# Patient Record
Sex: FEMALE | Race: BLACK OR AFRICAN AMERICAN | NOT HISPANIC OR LATINO | URBAN - METROPOLITAN AREA
[De-identification: names, ages, dates, MRNs, and addresses within clinical notes are randomized per-mention and may not be internally consistent; named-entity substitution may affect disease eponyms.]

---

## 2018-09-21 ENCOUNTER — HOSPITAL ENCOUNTER (OUTPATIENT)
Dept: MEDSURG UNIT | Facility: HOSPITAL | Age: 43
End: 2018-09-22

## 2019-02-04 ENCOUNTER — HISTORICAL (OUTPATIENT)
Dept: INFUSION THERAPY | Facility: HOSPITAL | Age: 44
End: 2019-02-04

## 2019-12-15 ENCOUNTER — HISTORICAL (OUTPATIENT)
Dept: INFUSION THERAPY | Facility: HOSPITAL | Age: 44
End: 2019-12-15

## 2019-12-19 ENCOUNTER — HISTORICAL (OUTPATIENT)
Dept: INFUSION THERAPY | Facility: HOSPITAL | Age: 44
End: 2019-12-19

## 2022-03-24 ENCOUNTER — HISTORICAL (OUTPATIENT)
Dept: RADIOLOGY | Facility: HOSPITAL | Age: 47
End: 2022-03-24

## 2022-03-24 ENCOUNTER — HISTORICAL (OUTPATIENT)
Dept: ADMINISTRATIVE | Facility: HOSPITAL | Age: 47
End: 2022-03-24

## 2022-03-24 LAB
ALBUMIN SERPL-MCNC: 3.6 G/DL (ref 3.5–5)
ALBUMIN/GLOB SERPL: 1 {RATIO} (ref 1.1–2)
ALP SERPL-CCNC: 113 U/L (ref 40–150)
ALT SERPL-CCNC: 21 U/L (ref 0–55)
AST SERPL-CCNC: 14 U/L (ref 5–34)
BILIRUB SERPL-MCNC: 0.3 MG/DL
BILIRUBIN DIRECT+TOT PNL SERPL-MCNC: 0.1 (ref 0–0.8)
BILIRUBIN DIRECT+TOT PNL SERPL-MCNC: 0.2 (ref 0–0.5)
BUN SERPL-MCNC: 13.9 MG/DL (ref 7–18.7)
CALCIUM SERPL-MCNC: 8.7 MG/DL (ref 8.7–10.5)
CHLORIDE SERPL-SCNC: 105 MMOL/L (ref 98–107)
CHOLEST SERPL-MCNC: 194 MG/DL
CHOLEST/HDLC SERPL: 3 {RATIO} (ref 0–5)
CO2 SERPL-SCNC: 22 MMOL/L (ref 22–29)
CREAT SERPL-MCNC: 0.66 MG/DL (ref 0.55–1.02)
DEPRECATED CALCIDIOL+CALCIFEROL SERPL-MC: 10.2 NG/ML (ref 30–80)
ERYTHROCYTE [DISTWIDTH] IN BLOOD BY AUTOMATED COUNT: 19.7 % (ref 11.5–17)
FERRITIN SERPL-MCNC: 5.7 NG/ML (ref 4.63–204)
FOLATE SERPL-MCNC: 8.8 NG/ML (ref 7–31.4)
GLOBULIN SER-MCNC: 3.6 G/DL (ref 2.4–3.5)
GLUCOSE SERPL-MCNC: 95 MG/DL (ref 74–100)
HCT VFR BLD AUTO: 34.9 % (ref 37–47)
HDLC SERPL-MCNC: 60 MG/DL (ref 35–60)
HEMOLYSIS INTERF INDEX SERPL-ACNC: <0
HGB BLD-MCNC: 10.2 G/DL (ref 12–16)
ICTERIC INTERF INDEX SERPL-ACNC: 0
IRON SATN MFR SERPL: 4 % (ref 20–50)
IRON SERPL-MCNC: 18 UG/DL (ref 50–170)
LDLC SERPL CALC-MCNC: 117 MG/DL (ref 50–140)
LIPEMIC INTERF INDEX SERPL-ACNC: 5
MCH RBC QN AUTO: 20.3 PG (ref 27–31)
MCHC RBC AUTO-ENTMCNC: 29.2 G/DL (ref 33–36)
MCV RBC AUTO: 69.5 FL (ref 80–94)
PLATELET # BLD AUTO: 374 10*3/UL (ref 130–400)
PMV BLD AUTO: NORMAL FL (ref 9.4–12.4)
POTASSIUM SERPL-SCNC: 3.8 MMOL/L (ref 3.5–5.1)
PROT SERPL-MCNC: 7.2 G/DL (ref 6.4–8.3)
RBC # BLD AUTO: 5.02 10*6/UL (ref 4.2–5.4)
SODIUM SERPL-SCNC: 136 MMOL/L (ref 136–145)
T3FREE SERPL-MCNC: 2.68 PG/ML (ref 1.58–3.91)
T4 FREE SERPL-MCNC: 0.74 NG/DL (ref 0.7–1.48)
TIBC SERPL-MCNC: 398 UG/DL (ref 70–310)
TIBC SERPL-MCNC: 416 UG/DL (ref 250–450)
TRANSFERRIN SERPL-MCNC: 424 MG/DL (ref 180–382)
TRIGL SERPL-MCNC: 84 MG/DL (ref 37–140)
TSH SERPL-ACNC: 2.6 M[IU]/L (ref 0.35–4.94)
VIT B12 SERPL-MCNC: 242 PG/ML (ref 213–816)
VLDLC SERPL CALC-MCNC: 17 MG/DL
WBC # SPEC AUTO: 6.1 10*3/UL (ref 4.5–11.5)

## 2022-04-01 LAB — VITAMIN B1: 117 (ref 70–180)

## 2023-05-02 ENCOUNTER — HOSPITAL ENCOUNTER (EMERGENCY)
Facility: HOSPITAL | Age: 48
Discharge: LEFT BEFORE DISCHARGE | End: 2023-05-02
Attending: SPECIALIST
Payer: COMMERCIAL

## 2023-05-02 VITALS
TEMPERATURE: 101 F | DIASTOLIC BLOOD PRESSURE: 155 MMHG | HEART RATE: 155 BPM | BODY MASS INDEX: 41.47 KG/M2 | WEIGHT: 280 LBS | OXYGEN SATURATION: 96 % | HEIGHT: 69 IN | RESPIRATION RATE: 26 BRPM | SYSTOLIC BLOOD PRESSURE: 182 MMHG

## 2023-05-02 DIAGNOSIS — J18.9 PNEUMONIA OF RIGHT UPPER LOBE DUE TO INFECTIOUS ORGANISM: ICD-10-CM

## 2023-05-02 DIAGNOSIS — R06.00 DYSPNEA: ICD-10-CM

## 2023-05-02 DIAGNOSIS — R91.8 MASS OF RIGHT LUNG: Primary | ICD-10-CM

## 2023-05-02 LAB
ABS NEUT CALC (OHS): 18.59 X10(3)/MCL (ref 2.1–9.2)
ACANTHOCYTES (OLG): ABNORMAL
ALBUMIN SERPL-MCNC: 2.8 G/DL (ref 3.5–5)
ALBUMIN/GLOB SERPL: 0.5 RATIO (ref 1.1–2)
ALP SERPL-CCNC: 110 UNIT/L (ref 40–150)
ALT SERPL-CCNC: 33 UNIT/L (ref 0–55)
ANISOCYTOSIS BLD QL SMEAR: ABNORMAL
AST SERPL-CCNC: 32 UNIT/L (ref 5–34)
BILIRUBIN DIRECT+TOT PNL SERPL-MCNC: 0.4 MG/DL
BNP BLD-MCNC: 23.6 PG/ML
BUN SERPL-MCNC: 19.3 MG/DL (ref 7–18.7)
CALCIUM SERPL-MCNC: 9.6 MG/DL (ref 8.4–10.2)
CHLORIDE SERPL-SCNC: 98 MMOL/L (ref 98–107)
CO2 SERPL-SCNC: 18 MMOL/L (ref 22–29)
CREAT SERPL-MCNC: 1.2 MG/DL (ref 0.55–1.02)
D DIMER PPP IA.FEU-MCNC: 2.76 UG/ML FEU (ref 0–0.5)
ERYTHROCYTE [DISTWIDTH] IN BLOOD BY AUTOMATED COUNT: 20.7 % (ref 11.5–17)
FLUAV AG UPPER RESP QL IA.RAPID: NOT DETECTED
FLUBV AG UPPER RESP QL IA.RAPID: NOT DETECTED
GFR SERPLBLD CREATININE-BSD FMLA CKD-EPI: 56 MLS/MIN/1.73/M2
GIANT PLATELETS: ABNORMAL
GLOBULIN SER-MCNC: 5.3 GM/DL (ref 2.4–3.5)
GLUCOSE SERPL-MCNC: 159 MG/DL (ref 74–100)
HCT VFR BLD AUTO: 31.8 % (ref 37–47)
HGB BLD-MCNC: 9.6 G/DL (ref 12–16)
LACTATE SERPL-SCNC: 2.8 MMOL/L (ref 0.5–2.2)
LYMPHOCYTES NFR BLD MANUAL: 13 % (ref 13–40)
LYMPHOCYTES NFR BLD MANUAL: 2.81 X10(3)/MCL
MACROCYTES BLD QL SMEAR: ABNORMAL
MCH RBC QN AUTO: 18.7 PG (ref 27–31)
MCHC RBC AUTO-ENTMCNC: 30.2 G/DL (ref 33–36)
MCV RBC AUTO: 62 FL (ref 80–94)
METAMYELOCYTES NFR BLD MANUAL: 1 %
MICROCYTES BLD QL SMEAR: ABNORMAL
MONOCYTES NFR BLD MANUAL: 0.22 X10(3)/MCL (ref 0.1–1.3)
MONOCYTES NFR BLD MANUAL: 1 % (ref 2–11)
NEUTROPHILS NFR BLD MANUAL: 79 % (ref 47–80)
NEUTS BAND NFR BLD MANUAL: 6 % (ref 0–11)
PLATELET # BLD AUTO: 380 X10(3)/MCL (ref 130–400)
PLATELET # BLD EST: ABNORMAL 10*3/UL
PMV BLD AUTO: ABNORMAL FL
POIKILOCYTOSIS BLD QL SMEAR: ABNORMAL
POTASSIUM SERPL-SCNC: 3.4 MMOL/L (ref 3.5–5.1)
PROT SERPL-MCNC: 8.1 GM/DL (ref 6.4–8.3)
RBC # BLD AUTO: 5.13 X10(6)/MCL (ref 4.2–5.4)
RBC MORPH BLD: ABNORMAL
SARS-COV-2 RNA RESP QL NAA+PROBE: NOT DETECTED
SODIUM SERPL-SCNC: 131 MMOL/L (ref 136–145)
STOMATOCYTES (OLG): ABNORMAL
WBC # SPEC AUTO: 21.62 X10(3)/MCL (ref 4.5–11.5)

## 2023-05-02 PROCEDURE — 25000003 PHARM REV CODE 250: Performed by: SPECIALIST

## 2023-05-02 PROCEDURE — 85060 BLOOD SMEAR INTERPRETATION: CPT | Performed by: SPECIALIST

## 2023-05-02 PROCEDURE — 93005 ELECTROCARDIOGRAM TRACING: CPT

## 2023-05-02 PROCEDURE — 0240U COVID/FLU A&B PCR: CPT | Performed by: SPECIALIST

## 2023-05-02 PROCEDURE — 93010 EKG 12-LEAD: ICD-10-PCS | Mod: ,,, | Performed by: INTERNAL MEDICINE

## 2023-05-02 PROCEDURE — 87040 BLOOD CULTURE FOR BACTERIA: CPT | Performed by: SPECIALIST

## 2023-05-02 PROCEDURE — 85025 COMPLETE CBC W/AUTO DIFF WBC: CPT | Performed by: SPECIALIST

## 2023-05-02 PROCEDURE — 80053 COMPREHEN METABOLIC PANEL: CPT | Performed by: SPECIALIST

## 2023-05-02 PROCEDURE — 85379 FIBRIN DEGRADATION QUANT: CPT | Performed by: SPECIALIST

## 2023-05-02 PROCEDURE — 99285 EMERGENCY DEPT VISIT HI MDM: CPT | Mod: 25

## 2023-05-02 PROCEDURE — 85027 COMPLETE CBC AUTOMATED: CPT | Performed by: SPECIALIST

## 2023-05-02 PROCEDURE — 83880 ASSAY OF NATRIURETIC PEPTIDE: CPT | Performed by: SPECIALIST

## 2023-05-02 PROCEDURE — 93010 ELECTROCARDIOGRAM REPORT: CPT | Mod: ,,, | Performed by: INTERNAL MEDICINE

## 2023-05-02 PROCEDURE — 83605 ASSAY OF LACTIC ACID: CPT | Performed by: SPECIALIST

## 2023-05-02 RX ORDER — ONDANSETRON 4 MG/1
4 TABLET, ORALLY DISINTEGRATING ORAL
Status: COMPLETED | OUTPATIENT
Start: 2023-05-02 | End: 2023-05-02

## 2023-05-02 RX ORDER — ACETAMINOPHEN 500 MG
1000 TABLET ORAL
Status: COMPLETED | OUTPATIENT
Start: 2023-05-02 | End: 2023-05-02

## 2023-05-02 RX ADMIN — ONDANSETRON 4 MG: 4 TABLET, ORALLY DISINTEGRATING ORAL at 09:05

## 2023-05-02 RX ADMIN — ACETAMINOPHEN 1000 MG: 500 TABLET, FILM COATED ORAL at 09:05

## 2023-05-03 LAB — HEMATOLOGIST REVIEW: NORMAL

## 2023-05-03 RX ORDER — AMOXICILLIN AND CLAVULANATE POTASSIUM 875; 125 MG/1; MG/1
1 TABLET, FILM COATED ORAL 2 TIMES DAILY
Qty: 14 TABLET | Refills: 0 | Status: SHIPPED | OUTPATIENT
Start: 2023-05-03

## 2023-05-03 NOTE — ED PROVIDER NOTES
Encounter Date: 5/2/2023       History     Chief Complaint   Patient presents with    Shortness of Breath     Vomiting x2 days, co SOB, patient poor historian at this time     Patient reports stating she is been vomiting for 2 days and complains of shortness of breath and dry cough; bhavik historian    The history is provided by the patient.   Review of patient's allergies indicates:  No Known Allergies  No past medical history on file.  No past surgical history on file.  No family history on file.     Review of Systems   Constitutional: Negative.    HENT: Negative.     Respiratory: Negative.     Cardiovascular: Negative.    Gastrointestinal: Negative.    Musculoskeletal: Negative.    Skin: Negative.    Neurological: Negative.    All other systems reviewed and are negative.    Physical Exam     Initial Vitals [05/02/23 2111]   BP Pulse Resp Temp SpO2   (!) 182/155 (!) 155 (!) 26 (!) 100.6 °F (38.1 °C) 96 %      MAP       --         Physical Exam    Nursing note and vitals reviewed.  Constitutional: She appears well-developed and well-nourished.   HENT:   Head: Normocephalic and atraumatic.   Eyes: EOM are normal. Pupils are equal, round, and reactive to light.   Neck: Neck supple.   Normal range of motion.  Cardiovascular:  Normal rate, regular rhythm, normal heart sounds and intact distal pulses.           Pulmonary/Chest: She has rhonchi (right lung).   Abdominal: Abdomen is soft. There is no abdominal tenderness.   Obese There is no rebound.   Musculoskeletal:         General: Normal range of motion.      Cervical back: Normal range of motion and neck supple.     Neurological: She is alert and oriented to person, place, and time. She has normal strength.   Skin: Skin is warm and dry.       ED Course   Procedures  Labs Reviewed   COMPREHENSIVE METABOLIC PANEL - Abnormal; Notable for the following components:       Result Value    Sodium Level 131 (*)     Potassium Level 3.4 (*)     Carbon Dioxide 18 (*)     Glucose  Level 159 (*)     Blood Urea Nitrogen 19.3 (*)     Creatinine 1.20 (*)     Albumin Level 2.8 (*)     Globulin 5.3 (*)     Albumin/Globulin Ratio 0.5 (*)     All other components within normal limits   CBC WITH DIFFERENTIAL - Abnormal; Notable for the following components:    WBC 21.62 (*)     Hgb 9.6 (*)     Hct 31.8 (*)     MCV 62.0 (*)     MCH 18.7 (*)     MCHC 30.2 (*)     RDW 20.7 (*)     All other components within normal limits   D DIMER, QUANTITATIVE - Abnormal; Notable for the following components:    D-Dimer 2.76 (*)     All other components within normal limits   LACTIC ACID, PLASMA - Abnormal; Notable for the following components:    Lactic Acid Level 2.8 (*)     All other components within normal limits   MANUAL DIFFERENTIAL - Abnormal; Notable for the following components:    Monocyte Man 1 (*)     Abs Neut calc 18.5932 (*)     RBC Morph Abnormal (*)     Anisocyte 1+ (*)     Poik 2+ (*)     Microcyte 2+ (*)     Macrocyte 1+ (*)     Acanthocytes 1+ (*)     Stomatocytes 1+ (*)     Platelet Est Increased (*)     All other components within normal limits   COVID/FLU A&B PCR - Normal    Narrative:     The Xpert Xpress SARS-CoV-2/FLU/RSV plus is a rapid, multiplexed real-time PCR test intended for the simultaneous qualitative detection and differentiation of SARS-CoV-2, Influenza A, Influenza B, and respiratory syncytial virus (RSV) viral RNA in either nasopharyngeal swab or nasal swab specimens.         B-TYPE NATRIURETIC PEPTIDE - Normal   BLOOD CULTURE [facility]   BLOOD CULTURE [facility]   CBC W/ AUTO DIFFERENTIAL    Narrative:     The following orders were created for panel order CBC auto differential.  Procedure                               Abnormality         Status                     ---------                               -----------         ------                     CBC with Differential[[id number]]        Abnormal            Final result               Manual Differential[[id number]]          Abnormal             Final result                 Please view results for these tests on the individual orders.   PATH REVIEW OF BLOOD SMEAR     EKG Readings: (Independently Interpreted)   Rhythm: Sinus Tachycardia. Heart Rate: 166. Ectopy: No Ectopy. ST Segments: Normal ST Segments. T Waves: Normal. Axis: Normal.   Left ventricular hypertrophy with repolarization abnormality     Imaging Results              CT Chest Without Contrast (Preliminary result)  Result time 05/02/23 22:39:22      Preliminary result by Andrew Benitez Jr., MD (05/02/23 22:39:22)                   Narrative:    START OF REPORT:  TECHNIQUE: CT SCAN OF THE CHEST WAS PERFORMED WITHOUT INTRAVENOUS CONTRAST WITH DIRECT AXIAL AS WELL AS SAGITTAL AND, CORONAL, RECONSTRUCTION IMAGES.    DOSAGE INFORMATION: AUTOMATED EXPOSURE CONTROL WAS UTILIZED.    COMPARISON: NONE.    CLINICAL HISTORY: SHORTNESS OF BREATH (VOMITING X2 DAYS, CO SOB, PATIENT POOR HISTORIAN AT THIS TIME).    Findings:  Soft Tissues: Unremarkable.  Neck: The visualized soft tissues of the neck appear unremarkable.  Mediastinum: The mediastinal structures are within normal limits.  Heart: The heart appears unremarkable.  Aorta: Unremarkable appearing aorta.  Pulmonary Arteries: Unremarkable.  Lungs: There is a dense consolidation with air bronchograms in the right upper lung field posteriorly concerning for a consolidated pneumonia. A few scattered streaky linear opacities are seen in both mid lung fields and in the right lower lung field.  Pleura: No effusions or pneumothorax are identified.  Bony Structures:  Spine: The visualized dorsal spine appears unremarkable.  Ribs: No rib fractures are identified.  Abdomen: Surgical staples are seen in the distal esophagus and stomach suggesting prior surgical history. Small hiatal hernia.      Impression:  1. There is a dense consolidation with air bronchograms in the right upper lung field posteriorly concerning for a consolidated pneumonia.  2. Details  and other findings as discussed above.                                         X-Ray Chest AP Portable (Final result)  Result time 05/02/23 21:37:00      Final result by Marshall Talavera MD (05/02/23 21:37:00)                   Impression:      Right chest large masslike opacification portion of which may be pleural based.  Further assessment with CT chest is recommended.      Electronically signed by: Marshall Talavera  Date:    05/02/2023  Time:    21:37               Narrative:    EXAMINATION:  XR CHEST AP PORTABLE    CLINICAL HISTORY:  dyspnea;    TECHNIQUE:  One view    COMPARISON:  September 21, 2018.    FINDINGS:  Cardiopericardial silhouette is within normal limits.  There is right upper to mid chest large masslike consolidation portion which may be pleural based.  There is compressive atelectasis of portion of the right lung.  Left lung and the pleural space are unremarkable.  No pneumothorax.                                       Medications   ondansetron disintegrating tablet 4 mg (4 mg Oral Given 5/2/23 2115)   acetaminophen tablet 1,000 mg (1,000 mg Oral Given 5/2/23 2115)     Medical Decision Making:   Initial Assessment:   Patient seen with nausea and vomiting for 2 days with dry cough; patient poor historian  Differential Diagnosis:   Electrolyte abnormality, viral, pneumonia, reactive airways  Clinical Tests:   Lab Tests: Ordered and Reviewed  Radiological Study: Ordered and Reviewed  ED Management:  Patient left against medical advice shortly after doing her CT of the chest; many of her laboratory results had not returned yet but I did implore the necessity of staying and completing her workup as she had a lung mass seen on chest x-ray and she felt short of breath with tachycardia and fever; patient refused and left with her  understanding the risk of death or respiratory compromise;    Reviewed CT after patient left and appearance and impression of lung consolidation thought to be possible  "pneumonia; will send an antibiotic and I have contacted the patient and informed her the antibiotic was called into her pharmacy and encouraged her to follow up with her primary care physician tomorrow           ED Course as of 05/03/23 0051   Tue May 02, 2023   2242 WBC(!): 21.62 [DD]   2243 D-Dimer(!): 2.76 [DD]   2243 Lactate, Gustavo(!): 2.8 [DD]      ED Course User Index  [DD] Forrest Allan MD        Patient Vitals for the past 24 hrs:   BP Temp Pulse Resp SpO2 Height Weight   05/02/23 2115 -- (!) 100.6 °F (38.1 °C) -- -- -- -- --   05/02/23 2111 (!) 182/155 (!) 100.6 °F (38.1 °C) (!) 155 (!) 26 96 % 5' 9" (1.753 m) 127 kg (280 lb)          Date: 5/2/2023  Patient: Glory Perera  Admitted: 5/2/2023  9:09 PM  Attending Provider: Forrest Allan MD    Glory Perera or her authorized caregiver has made the decision for the patient to leave the emergency department against the advice of Dr. Allan. She or her authorized caregiver has been informed and understands the inherent risks, including death, respiratory compromise.  She or her authorized caregiver has decided to accept the responsibility for this decision. Glory Perera and all necessary parties have been advised that she may return for further evaluation or treatment. Her condition at time of discharge was stable.  Glory Perera had current vital signs as follows:  BP (!) 182/155   Pulse (!) 155   Temp (!) 100.6 °F (38.1 °C)   Resp (!) 26   Ht 5' 9" (1.753 m)   Wt 127 kg (280 lb)     Clinical Impression:   Final diagnoses:  [R06.00] Dyspnea  [R91.8] Mass of right lung (Primary)  [J18.9] Pneumonia of right upper lobe due to infectious organism        ED Disposition Condition    AMA Stable                Forrest Allan MD  05/03/23 0042       Forrest Allan MD  05/03/23 0051    "

## 2023-05-08 LAB
BACTERIA BLD CULT: NORMAL
BACTERIA BLD CULT: NORMAL

## 2024-09-26 DIAGNOSIS — M81.0 AGE RELATED OSTEOPOROSIS: ICD-10-CM

## 2024-09-26 DIAGNOSIS — Z12.31 BREAST CANCER SCREENING BY MAMMOGRAM: Primary | ICD-10-CM

## 2025-05-08 DIAGNOSIS — M81.0 AGE-RELATED OSTEOPOROSIS WITHOUT CURRENT PATHOLOGICAL FRACTURE: ICD-10-CM

## 2025-05-08 DIAGNOSIS — Z12.31 SCREENING MAMMOGRAM FOR BREAST CANCER: Primary | ICD-10-CM

## 2025-06-03 ENCOUNTER — HOSPITAL ENCOUNTER (OUTPATIENT)
Dept: RADIOLOGY | Facility: HOSPITAL | Age: 50
Discharge: HOME | End: 2025-06-03
Attending: INTERNAL MEDICINE
Payer: COMMERCIAL

## 2025-06-03 DIAGNOSIS — Z12.31 SCREENING MAMMOGRAM FOR BREAST CANCER: ICD-10-CM

## 2025-06-03 DIAGNOSIS — M81.0 AGE-RELATED OSTEOPOROSIS WITHOUT CURRENT PATHOLOGICAL FRACTURE: ICD-10-CM

## 2025-06-03 PROCEDURE — 77067 SCR MAMMO BI INCL CAD: CPT | Mod: TC

## 2025-06-03 PROCEDURE — 77080 DXA BONE DENSITY AXIAL: CPT | Mod: TC
